# Patient Record
Sex: MALE
[De-identification: names, ages, dates, MRNs, and addresses within clinical notes are randomized per-mention and may not be internally consistent; named-entity substitution may affect disease eponyms.]

---

## 2018-10-06 ENCOUNTER — HOSPITAL ENCOUNTER (EMERGENCY)
Dept: HOSPITAL 31 - C.ER | Age: 38
Discharge: HOME | End: 2018-10-06
Payer: COMMERCIAL

## 2018-10-06 VITALS — SYSTOLIC BLOOD PRESSURE: 130 MMHG | OXYGEN SATURATION: 99 % | DIASTOLIC BLOOD PRESSURE: 75 MMHG | HEART RATE: 72 BPM

## 2018-10-06 VITALS — RESPIRATION RATE: 16 BRPM

## 2018-10-06 VITALS — TEMPERATURE: 98.6 F

## 2018-10-06 DIAGNOSIS — R07.89: Primary | ICD-10-CM

## 2018-10-06 NOTE — C.PDOC
History Of Present Illness





37 years old male with no PMHx presents to ED for complaints of intermittent 

5/10 right-sided chest pain radiating along the midaxillary line associated with

nausea and subjective fever that began last night. Patient states pain  "woke me

up from my sleep." Denies headache, diarrhea, shortness of breath, Hx of 

smoking, or any other physical complaints. Patient reports experiencing similar 

symptoms in the past. Patient also reports eating a "greasy pepperoni chessy 

roll at 7:30PM last night." 


Chief Complaint (Nursing): Chest Pain


History Per: Patient


History/Exam Limitations: no limitations


Onset/Duration Of Symptoms: Hrs


Current Symptoms Are (Timing): Still Present


Context: Food


Severity: Moderate


Pain Scale Rating Of: 5


Associated Symptoms: Nausea


Modifying Factors: None


Exacerbating Factors: None


Alleviating Factors: None


Recent travel outside of the [place]: No





Past Medical History


Reviewed: Historical Data, Nursing Documentation, Vital Signs


Vital Signs: 





                                Last Vital Signs











Temp  98.6 F   10/06/18 16:10


 


Pulse  80   10/06/18 16:10


 


Resp  18   10/06/18 16:10


 


BP  117/76   10/06/18 16:10


 


Pulse Ox  20 L  10/06/18 16:10














- Medical History


PMH: No Chronic Diseases


Surgical History: No Surg Hx


Family History: States: No Known Family Hx





- Social History


Hx Alcohol Use: No


Hx Substance Use: No





- Immunization History


Hx Tetanus Toxoid Vaccination: No


Hx Influenza Vaccination: No


Hx Pneumococcal Vaccination: No





Review Of Systems


Constitutional: Negative for: Fever, Chills


Cardiovascular: Positive for: Chest Pain (Right sided radiating to midaxillary 

line )


Respiratory: Negative for: Shortness of Breath


Gastrointestinal: Positive for: Nausea.  Negative for: Vomiting, Diarrhea


Skin: Negative for: Rash


Neurological: Negative for: Weakness, Numbness





Physical Exam





- Physical Exam


Appears: Non-toxic, No Acute Distress, Other (Obese)


Skin: Normal Color, Warm, Dry, No Rash


Head: Atraumatic, Normacephalic


Eye(s): bilateral: Normal Inspection, PERRL, EOMI


Ear(s): Bilateral: Normal


Nose: Normal


Oral Mucosa: Moist


Throat: Normal


Neck: Supple


Chest: Symmetrical, No Tenderness


Cardiovascular: Rhythm Regular, No Murmur


Respiratory: Normal Breath Sounds, No Decreased Breath Sounds, No Rales, No 

Rhonchi, No Wheezing


Gastrointestinal/Abdominal: Bowel Sounds (Active ), Soft, No Tenderness, No 

Distention, No Guarding, No Rebound


Back: Normal Inspection, No CVA Tenderness


Extremity: Normal ROM, No Deformity


Extremity: Bilateral: Atraumatic, Normal Color And Temperature, Normal ROM


Pulses: Left Radial: Normal, Right Radial: Normal


Neurological/Psych: Oriented x3, Normal Speech, Other (No focal deficits )


Gait: Steady





ED Course And Treatment


O2 Sat by Pulse Oximetry: 20 (RA)





- Other Rad


  ** CXR


X-Ray: Viewed By Me, Read By Radiologist


Interpretation: Date of service:  10/06/2018.  HISTORY:  right side chest pain. 

COMPARISON:  No prior.  TECHNIQUE:  Chest PA and lateral.  FINDINGS:  LUNGS:  

Minimal irregular linear scarring and/or atelectasis left lung base.  PLEURA:  

No significant pleural effusion identified. No pneumothorax apparent.  

CARDIOVASCULAR:  Normal.  OSSEOUS STRUCTURES:  No significant abnormalities.  

VISUALIZED UPPER ABDOMEN:  Normal.  OTHER FINDINGS:  None.  IMPRESSION:  Minimal

irregular linear scarring and/or atelectasis left lung base





Medical Decision Making


Medical Decision Making: 





Plan:


* Maalox


* Motrin


* Pepcid


* Tylenol


* CXR





EKG:


* Normal Sinus Rhythm at 75 bpm


* No ST elevations of depressions 





Disposition





- Disposition


Referrals: 


[facility] [Outside]


Disposition: HOME/ ROUTINE


Disposition Time: 18:56


Condition: STABLE


Prescriptions: 


Ibuprofen [Motrin] 600 mg PO TID #15 tab


Instructions:  Costochondritis (DC)


Forms:  [facility] (English), General Discharge Instructions





- POA


Present On Arrival: None





- Clinical Impression


Clinical Impression: 


 Chest wall pain








- Scribe Statement


The provider has reviewed the documentation as recorded by the Jerrell Carr





All medical record entries made by the Gioibdashawn were at my direction and 

personally dictated by me. I have reviewed the chart and agree that the record 

accurately reflects my personal performance of the history, physical exam, 

medical decision making, and the department course for this patient. I have also

 personally directed, reviewed, and agree with the discharge instructions and 

disposition.

## 2018-10-06 NOTE — RAD
Date of service: 



10/06/2018



HISTORY:

 right side chest pain 



COMPARISON:

No prior.



TECHNIQUE:

Chest PA and lateral



FINDINGS:



LUNGS:

Minimal irregular linear scarring and/or atelectasis left lung base 



PLEURA:

No significant pleural effusion identified. No pneumothorax apparent.



CARDIOVASCULAR:

Normal.



OSSEOUS STRUCTURES:

No significant abnormalities.



VISUALIZED UPPER ABDOMEN:

Normal.



OTHER FINDINGS:

None.



IMPRESSION:

Minimal irregular linear scarring and/or atelectasis left lung base

## 2018-10-08 NOTE — CARD
--------------- APPROVED REPORT --------------





Date of service: 10/06/2018



EKG Measurement

Heart [id number]

[id number]

[id number]

[id number]

[id number]



<Conclusion>

Normal sinus rhythm

Normal ECG